# Patient Record
Sex: FEMALE | Race: BLACK OR AFRICAN AMERICAN | NOT HISPANIC OR LATINO
[De-identification: names, ages, dates, MRNs, and addresses within clinical notes are randomized per-mention and may not be internally consistent; named-entity substitution may affect disease eponyms.]

---

## 2022-06-27 ENCOUNTER — APPOINTMENT (OUTPATIENT)
Dept: BREAST CENTER | Facility: CLINIC | Age: 52
End: 2022-06-27
Payer: COMMERCIAL

## 2022-06-27 VITALS — HEIGHT: 65 IN | WEIGHT: 220 LBS | BODY MASS INDEX: 36.65 KG/M2

## 2022-06-27 DIAGNOSIS — Z80.0 FAMILY HISTORY OF MALIGNANT NEOPLASM OF DIGESTIVE ORGANS: ICD-10-CM

## 2022-06-27 DIAGNOSIS — R92.8 OTHER ABNORMAL AND INCONCLUSIVE FINDINGS ON DIAGNOSTIC IMAGING OF BREAST: ICD-10-CM

## 2022-06-27 DIAGNOSIS — Z80.1 FAMILY HISTORY OF MALIGNANT NEOPLASM OF TRACHEA, BRONCHUS AND LUNG: ICD-10-CM

## 2022-06-27 DIAGNOSIS — Z85.72 PERSONAL HISTORY OF NON-HODGKIN LYMPHOMAS: ICD-10-CM

## 2022-06-27 PROBLEM — Z00.00 ENCOUNTER FOR PREVENTIVE HEALTH EXAMINATION: Status: ACTIVE | Noted: 2022-06-27

## 2022-06-27 PROCEDURE — 99203 OFFICE O/P NEW LOW 30 MIN: CPT

## 2022-06-27 RX ORDER — HYDROCHLOROTHIAZIDE 25 MG/1
25 TABLET ORAL
Refills: 0 | Status: ACTIVE | COMMUNITY

## 2022-06-27 RX ORDER — FAMOTIDINE 20 MG/1
20 TABLET, FILM COATED ORAL
Refills: 0 | Status: ACTIVE | COMMUNITY

## 2022-06-27 RX ORDER — ESOMEPRAZOLE MAGNESIUM 40 MG/1
40 CAPSULE, DELAYED RELEASE ORAL
Refills: 0 | Status: ACTIVE | COMMUNITY

## 2022-06-27 NOTE — REVIEW OF SYSTEMS
[Cough] : cough [Negative] : Heme/Lymph [FreeTextEntry3] : change in eyesight [FreeTextEntry4] : change in eyesight

## 2022-06-27 NOTE — HISTORY OF PRESENT ILLNESS
[FreeTextEntry1] : The patient is a nulliparous female of [facility] descent.  She underwent menarche at age 13.  She has no family history of breast or ovarian cancer.  Her father had esophageal cancer and brother had lung cancer and both were smokers.  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  She has been getting routine screening mammography and ultrasound and recent bilateral mammography and ultrasound on June 24, 2022 showed a new 5 mm density in the right breast 10:00 region for which ultrasound-guided core biopsy is being recommended.  She comes in now for a surgical evaluation.

## 2022-06-27 NOTE — ASSESSMENT
[FreeTextEntry1] : The patient is a nulliparous female of [facility] descent.  She underwent menarche at age 13.  She has no family history of breast or ovarian cancer.  Her father had esophageal cancer and brother had lung cancer and both were smokers.  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  She has been getting routine screening mammography and ultrasound and recent bilateral mammography and ultrasound on June 24, 2022 showed a new 5 mm density in the right breast 10:00 region for which ultrasound-guided core biopsy is being recommended.  I reviewed her mammography and ultrasound from [facility] and the mammography was negative but ultrasound does show this 5 mm density in the right breast 10:00 aspect.  I agree with recommendation for an ultrasound-guided core biopsy of this density.  On exam, I cannot feel any suspicious densities however.  I will arrange the ultrasound-guided core biopsy and follow-up on the results and go from there.  The patient understands the need for the biopsy and agrees to proceed as planned.

## 2022-06-27 NOTE — PHYSICAL EXAM
[Normocephalic] : normocephalic [Atraumatic] : atraumatic [EOMI] : extra ocular movement intact [Supple] : supple [No Supraclavicular Adenopathy] : no supraclavicular adenopathy [No Cervical Adenopathy] : no cervical adenopathy [Examined in the supine and seated position] : examined in the supine and seated position [No dominant masses] : no dominant masses in right breast  [No dominant masses] : no dominant masses left breast [No Nipple Retraction] : no left nipple retraction [No Nipple Discharge] : no left nipple discharge [Breast Mass Right Breast ___cm] : no masses [Breast Mass Left Breast ___cm] : no masses [No Axillary Lymphadenopathy] : no left axillary lymphadenopathy [No Edema] : no edema [No Rashes] : no rashes [No Ulceration] : no ulceration [Breast Nipple Inversion] : nipples not inverted [Breast Nipple Retraction] : nipples not retracted [Breast Nipple Flattening] : nipples not flattened [Breast Nipple Fissures] : nipples not fissured [Breast Abnormal Lactation (Galactorrhea)] : no galactorrhea [Breast Abnormal Secretion Bloody Fluid] : no bloody discharge [Breast Abnormal Secretion Serous Fluid] : no serous discharge [Breast Abnormal Secretion Opalescent Fluid] : no milky discharge [de-identified] : On exam, the patient is ptotic D-cup breasts.  On palpation, she has typical fibroglandular nodularity in the upper outer aspects of both breast but no suspicious masses.  She has no axillary, supraclavicular, or cervical adenopathy.

## 2022-06-27 NOTE — REASON FOR VISIT
[Initial Evaluation] : an initial evaluation [FreeTextEntry1] : The patient comes in is of [place] descent with recent abnormal breast imaging for which a right breast biopsy is being recommended.

## 2022-06-27 NOTE — PAST MEDICAL HISTORY
[Menstruating] : The patient is menstruating [Menarche Age ____] : age at menarche was [unfilled] [Total Preg ___] : G[unfilled] [Live Births ___] : P[unfilled]

## 2023-01-03 ENCOUNTER — RESULT REVIEW (OUTPATIENT)
Age: 53
End: 2023-01-03

## 2023-03-24 ENCOUNTER — APPOINTMENT (OUTPATIENT)
Dept: GASTROENTEROLOGY | Facility: CLINIC | Age: 53
End: 2023-03-24
Payer: COMMERCIAL

## 2023-03-24 VITALS
OXYGEN SATURATION: 98 % | HEART RATE: 66 BPM | BODY MASS INDEX: 36.65 KG/M2 | WEIGHT: 220 LBS | SYSTOLIC BLOOD PRESSURE: 154 MMHG | DIASTOLIC BLOOD PRESSURE: 92 MMHG | HEIGHT: 65 IN

## 2023-03-24 DIAGNOSIS — Z78.9 OTHER SPECIFIED HEALTH STATUS: ICD-10-CM

## 2023-03-24 PROCEDURE — 99204 OFFICE O/P NEW MOD 45 MIN: CPT

## 2023-03-24 NOTE — ASSESSMENT
[FreeTextEntry1] : 1. GERD:  Dietary and lifestyle modification. Referred to weight loss management program\par \par 2. History of  colon polyp:  Colonoscopy 7/2024 ( will likely repeat EGD with colonoscopy) \par \par Pertinent available records reviewed\par Risks of the procedures including but not limited to bleeding / perforation / infection / anesthesia complication / missed  lesions explained to the  patient . The patient expressed understanding and a desire to proceed with the procedures.\par \par Risk of not doing procedures includes but is not limited to missed or delayed diagnosis of gastric pathology, colon cancer or other gastrointestinal pathology\par \par A consultation note was provided to the referring provider\par \par \par

## 2023-03-24 NOTE — CONSULT LETTER
[Dear  ___] : Dear  [unfilled], [Consult Letter:] : I had the pleasure of evaluating your patient, [unfilled]. [Please see my note below.] : Please see my note below. [Consult Closing:] : Thank you very much for allowing me to participate in the care of this patient.  If you have any questions, please do not hesitate to contact me. [Sincerely,] : Sincerely, [FreeTextEntry3] : Jenaro Randolph MD\par tel: [phone]\par fax: [phone]\par

## 2023-03-24 NOTE — HISTORY OF PRESENT ILLNESS
[FreeTextEntry1] : OMI SANTAMARIA  is being evaluated at the request of Dr. Wilkes for an opinion re: GERD / history of colon polyp.  Patient is transferring her GI care. Complains of persistent  heartburn, controlled with Nexium. Returns off Nexium. Has tried  Pepcid without  relief. Admits to weight gain\par \par Denies nausea, vomiting, fever, chills,m diarrhea, constipation, melena, hematemesis, BRBPR, dysphagia\par \par \par Records faxed to office and  reviewed :  Colonoscopy ( 7/2019) : diverticulosis / 2  polyps ( ascending / rectum) / normal terminal ileum.  EGD  ( 10/2019)  irregular  Z- line / gastritis/ / gastric  body polyp. Both procedures were performed by Dr. Almendarez at [facility]

## 2023-06-25 ENCOUNTER — RESULT REVIEW (OUTPATIENT)
Age: 53
End: 2023-06-25

## 2023-07-17 ENCOUNTER — RESULT REVIEW (OUTPATIENT)
Age: 53
End: 2023-07-17

## 2023-07-20 ENCOUNTER — NON-APPOINTMENT (OUTPATIENT)
Age: 53
End: 2023-07-20

## 2023-07-21 RX ORDER — SILVER SULFADIAZINE 10 MG/G
1 CREAM TOPICAL DAILY
Qty: 1 | Refills: 0 | Status: ACTIVE | COMMUNITY
Start: 2023-07-21 | End: 1900-01-01

## 2023-07-23 DIAGNOSIS — Z12.31 ENCOUNTER FOR SCREENING MAMMOGRAM FOR MALIGNANT NEOPLASM OF BREAST: ICD-10-CM

## 2023-08-08 ENCOUNTER — APPOINTMENT (OUTPATIENT)
Dept: PLASTIC SURGERY | Facility: CLINIC | Age: 53
End: 2023-08-08
Payer: COMMERCIAL

## 2023-08-08 ENCOUNTER — APPOINTMENT (OUTPATIENT)
Dept: BREAST CENTER | Facility: CLINIC | Age: 53
End: 2023-08-08
Payer: COMMERCIAL

## 2023-08-08 DIAGNOSIS — R92.2 INCONCLUSIVE MAMMOGRAM: ICD-10-CM

## 2023-08-08 DIAGNOSIS — D24.1 BENIGN NEOPLASM OF RIGHT BREAST: ICD-10-CM

## 2023-08-08 DIAGNOSIS — T30.0 BURN OF UNSPECIFIED BODY REGION, UNSPECIFIED DEGREE: ICD-10-CM

## 2023-08-08 DIAGNOSIS — N60.12 DIFFUSE CYSTIC MASTOPATHY OF LEFT BREAST: ICD-10-CM

## 2023-08-08 DIAGNOSIS — L98.8 OTHER SPECIFIED DISORDERS OF THE SKIN AND SUBCUTANEOUS TISSUE: ICD-10-CM

## 2023-08-08 DIAGNOSIS — N60.11 DIFFUSE CYSTIC MASTOPATHY OF LEFT BREAST: ICD-10-CM

## 2023-08-08 PROCEDURE — 99203 OFFICE O/P NEW LOW 30 MIN: CPT

## 2023-08-08 PROCEDURE — 99213 OFFICE O/P EST LOW 20 MIN: CPT

## 2023-08-08 NOTE — HISTORY OF PRESENT ILLNESS
[FreeTextEntry1] : The patient is a nulliparous female of [facility] descent.  She underwent menarche at age 13.  She has no family history of breast or ovarian cancer.  Her father had esophageal cancer and brother had lung cancer and both were smokers.  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  She has been getting routine screening mammography and ultrasound and bilateral mammography and ultrasound on June 24, 2022 showed a new 5 mm density in the right breast 10:00 region for which ultrasound-guided core biopsy was recommended and performed on June 29, 2022 which showed a fibroadenoma.  Her mammography and ultrasound performed on June 26, 2023 at [facility] showed a stable right breast 10:00 density but there was a new asymmetry seen in the inner aspect of the left breast which persisted on spot compression views and stereotactic biopsy was performed on July 18, 2023 with the final pathology just showing stromal fibrosis and predominantly fatty breast tissue which was felt to be concordant.  She did develop a significant area of full-thickness skin loss on the inferior aspect however of the left breast related to the stereotactic core biopsy.  I initially evaluated this soon after the stereotactic biopsy and she started Silvadene Telfa dressing changes.  She comes in now for reevaluation of this wound.

## 2023-08-08 NOTE — ASSESSMENT
[FreeTextEntry1] : The patient is a nulliparous female of [facility] descent.  She underwent menarche at age 13.  She has no family history of breast or ovarian cancer.  Her father had esophageal cancer and brother had lung cancer and both were smokers.  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  She has been getting routine screening mammography and ultrasound and recent bilateral mammography and ultrasound on June 24, 2022 showed a new 5 mm density in the right breast 10:00 region for which ultrasound-guided core biopsy was performed on June 29, 2022 showing a benign fibroadenoma.  Her most recent mammography and ultrasound performed on June 26, 2023 at [facility] showed a stable right breast 10:00 density but there was a new asymmetry seen in the inner aspect of the left breast which persisted on spot compression views and stereotactic biopsy was performed on July 18, 2023 with the final pathology just showing stromal fibrosis and predominantly fatty breast tissue which was felt to be concordant.  She did develop a significant area of full-thickness skin loss on the inferior aspect however of the left breast related to the stereotactic core biopsy.  I initially evaluated this soon after the stereotactic biopsy and she started Silvadene Telfa dressing changes.  This region is actually on the polar opposite side of the breast from the needle entrance site from the core biopsy.  I had spoken to the radiologist, Dr. Heaven Gabriel, regarding this burn and she is unsure how this developed.  I saw this first soon after the biopsy a couple weeks ago and started Silvadene.  On exam, the wound is clean with a central eschar and some peripheral early granulation.  I am advising continued Silvadene dressings but given the patient's concern I am having her see Dr. Moreno in consultation today to look at this wound to see if there is any advice he has to offer.  The patient will go down there today for consultation.

## 2023-08-08 NOTE — PHYSICAL EXAM
[Normocephalic] : normocephalic [Atraumatic] : atraumatic [EOMI] : extra ocular movement intact [Supple] : supple [No Supraclavicular Adenopathy] : no supraclavicular adenopathy [No Cervical Adenopathy] : no cervical adenopathy [Examined in the supine and seated position] : examined in the supine and seated position [No dominant masses] : no dominant masses in right breast  [No dominant masses] : no dominant masses left breast [No Nipple Retraction] : no left nipple retraction [No Nipple Discharge] : no left nipple discharge [Breast Mass Right Breast ___cm] : no masses [Breast Mass Left Breast ___cm] : no masses [No Axillary Lymphadenopathy] : no left axillary lymphadenopathy [No Edema] : no edema [Breast Nipple Inversion] : nipples not inverted [Breast Nipple Retraction] : nipples not retracted [Breast Nipple Flattening] : nipples not flattened [Breast Nipple Fissures] : nipples not fissured [Breast Abnormal Lactation (Galactorrhea)] : no galactorrhea [Breast Abnormal Secretion Bloody Fluid] : no bloody discharge [Breast Abnormal Secretion Serous Fluid] : no serous discharge [Breast Abnormal Secretion Opalescent Fluid] : no milky discharge [de-identified] : On exam, the patient is ptotic D-cup breasts.  On palpation, she has typical fibroglandular nodularity in the upper outer aspects of both breast but no suspicious masses.  She has this obvious superficial burn on the medial aspect of the left breast little bigger than a half dollar size with a central eschar and some peripheral granulation.  She has been using Silvadene and the wound looks clean.  She has no axillary, supraclavicular, or cervical adenopathy. [de-identified] : Medial inferior superficial burn with central eschar and surrounding healing with granulation.  Wound is clean. [de-identified] : Right lower inner quadrant superficial burn with central eschar and surrounding slow granulation

## 2023-08-08 NOTE — HISTORY OF PRESENT ILLNESS
[FreeTextEntry1] : 53 y/o female presents for initial consultation referred by Dr Perez for possibly left breast eschar. This occurred after a left breast stereotactic core biopsy. Patient reports no family history of breast or ovarian cancer. No history of bleeding/clotting disorders.  PE: Left breast full thickness skin necrosis   A/P: Recommended debridement. debridement today, but patient is opposed to surgical procedure. Will plan to continue dressing changes for now. Follow up in 2 weeks to reassess healing.

## 2023-08-08 NOTE — REASON FOR VISIT
[Follow-Up: _____] : a [unfilled] follow-up visit [FreeTextEntry1] : The patient comes in is of [facility] descent with a history of a right breast 10:00 ultrasound-guided core biopsy performed in June 2022 showing a fibroadenoma.  She then developed some asymmetry in the inner aspect of the left breast seen on mammography in June 2023 and underwent a left breast stereotactic core biopsy in July 2023 showing predominantly fatty tissue and stromal fibrosis which was felt to be concordant.  Unfortunately, she developed a burn on the lower inner aspect of the skin of the left breast and has been doing Silvadene dressings.  She comes in now for a wound evaluation.

## 2023-08-22 ENCOUNTER — APPOINTMENT (OUTPATIENT)
Dept: PLASTIC SURGERY | Facility: CLINIC | Age: 53
End: 2023-08-22

## 2024-02-02 ENCOUNTER — NON-APPOINTMENT (OUTPATIENT)
Age: 54
End: 2024-02-02

## 2024-05-10 ENCOUNTER — TRANSCRIPTION ENCOUNTER (OUTPATIENT)
Age: 54
End: 2024-05-10

## 2024-06-13 ENCOUNTER — APPOINTMENT (OUTPATIENT)
Dept: GASTROENTEROLOGY | Facility: CLINIC | Age: 54
End: 2024-06-13
Payer: COMMERCIAL

## 2024-06-13 VITALS
DIASTOLIC BLOOD PRESSURE: 82 MMHG | WEIGHT: 220 LBS | HEART RATE: 56 BPM | BODY MASS INDEX: 36.65 KG/M2 | SYSTOLIC BLOOD PRESSURE: 128 MMHG | OXYGEN SATURATION: 96 % | HEIGHT: 65 IN

## 2024-06-13 DIAGNOSIS — K21.9 GASTRO-ESOPHAGEAL REFLUX DISEASE W/OUT ESOPHAGITIS: ICD-10-CM

## 2024-06-13 DIAGNOSIS — K13.79 OTHER LESIONS OF ORAL MUCOSA: ICD-10-CM

## 2024-06-13 DIAGNOSIS — Z86.010 PERSONAL HISTORY OF COLONIC POLYPS: ICD-10-CM

## 2024-06-13 PROCEDURE — 99215 OFFICE O/P EST HI 40 MIN: CPT

## 2024-06-13 NOTE — PHYSICAL EXAM
[Alert] : alert [Sclera] : the sclera and conjunctiva were normal [Normal Appearance] : the appearance of the neck was normal [No Respiratory Distress] : no respiratory distress [Abdomen Soft] : soft [Abnormal Walk] : normal gait [Normal Color / Pigmentation] : normal skin color and pigmentation [Cranial Nerves Intact] : cranial nerves 2-12 were intact [Oriented To Time, Place, And Person] : oriented to person, place, and time [de-identified] : dark spot posterior right hard palate [de-identified] : Deferred pending colonoscopy

## 2024-06-13 NOTE — HISTORY OF PRESENT ILLNESS
[FreeTextEntry1] : Presents for follow up. Complains of increasing GERD ( especially with RUM) . Describes 2 episodes of self limited nausea in the past week.  Finally, has noticed a dark  / black spot on her right posterior hard palate over the past 2 months  Denies melena, hematemesis, fever, chills, diarrhea, constipation  Last evaluated in 3/2023  re: GERD / history of colon polyp. Patient was  transferring her GI care. Complained of persistent heartburn, controlled with Nexium. Returns off Nexium. Has tried Pepcid without relief. Admits to weight gain Lifes[employer] and dietary modification recommended. Referred for weight loss management. Plan was colonoscopy  with possible repeat EGD in  2024  Records faxed to office and reviewed : Colonoscopy ( 7/2019) : diverticulosis / 2 polyps ( ascending / rectum) / normal terminal ileum. EGD ( 10/2019) irregular Z- line / gastritis/ / gastric body polyp. Both procedures were performed by Dr. Almendarez at [facility]

## 2024-06-13 NOTE — ASSESSMENT
[FreeTextEntry1] : 1. GERD:  EGD planned along with dietary and lifestyle modification  2. History of colon polyp:  Colonoscopy planned  3. Spot on hard palate: recommended NET  and/or dental evaluation  Pertinent available records reviewed Risks of the procedures including but not limited to bleeding / perforation / infection / anesthesia complication / missed  lesions explained to the  patient . The patient expressed understanding and a desire to proceed with the procedures.  Risk of not doing procedures includes but is not limited to missed or delayed diagnosis of gastric pathology, colon cancer or other gastrointestinal pathology

## 2024-06-30 ENCOUNTER — RESULT REVIEW (OUTPATIENT)
Age: 54
End: 2024-06-30

## 2024-07-10 ENCOUNTER — RESULT REVIEW (OUTPATIENT)
Age: 54
End: 2024-07-10

## 2024-07-11 ENCOUNTER — APPOINTMENT (OUTPATIENT)
Dept: GASTROENTEROLOGY | Facility: HOSPITAL | Age: 54
End: 2024-07-11

## 2024-07-11 ENCOUNTER — RESULT REVIEW (OUTPATIENT)
Age: 54
End: 2024-07-11

## 2024-10-04 ENCOUNTER — APPOINTMENT (OUTPATIENT)
Dept: BREAST CENTER | Facility: CLINIC | Age: 54
End: 2024-10-04
Payer: COMMERCIAL

## 2024-10-04 VITALS
BODY MASS INDEX: 34.99 KG/M2 | WEIGHT: 210 LBS | HEIGHT: 65 IN | DIASTOLIC BLOOD PRESSURE: 90 MMHG | OXYGEN SATURATION: 98 % | SYSTOLIC BLOOD PRESSURE: 150 MMHG | HEART RATE: 82 BPM

## 2024-10-04 DIAGNOSIS — Z12.31 ENCOUNTER FOR SCREENING MAMMOGRAM FOR MALIGNANT NEOPLASM OF BREAST: ICD-10-CM

## 2024-10-04 DIAGNOSIS — R92.30 DENSE BREASTS, UNSPECIFIED: ICD-10-CM

## 2024-10-04 DIAGNOSIS — Z85.72 PERSONAL HISTORY OF NON-HODGKIN LYMPHOMAS: ICD-10-CM

## 2024-10-04 DIAGNOSIS — N60.12 DIFFUSE CYSTIC MASTOPATHY OF LEFT BREAST: ICD-10-CM

## 2024-10-04 DIAGNOSIS — D24.1 BENIGN NEOPLASM OF RIGHT BREAST: ICD-10-CM

## 2024-10-04 DIAGNOSIS — N60.11 DIFFUSE CYSTIC MASTOPATHY OF LEFT BREAST: ICD-10-CM

## 2024-10-04 PROCEDURE — 99212 OFFICE O/P EST SF 10 MIN: CPT

## 2024-10-04 NOTE — PHYSICAL EXAM
[Normocephalic] : normocephalic [Atraumatic] : atraumatic [EOMI] : extra ocular movement intact [Supple] : supple [No Supraclavicular Adenopathy] : no supraclavicular adenopathy [No Cervical Adenopathy] : no cervical adenopathy [Examined in the supine and seated position] : examined in the supine and seated position [No dominant masses] : no dominant masses in right breast  [No dominant masses] : no dominant masses left breast [No Nipple Retraction] : no left nipple retraction [No Nipple Discharge] : no left nipple discharge [Breast Mass Right Breast ___cm] : no masses [Breast Mass Left Breast ___cm] : no masses [No Axillary Lymphadenopathy] : no left axillary lymphadenopathy [No Edema] : no edema [No Rashes] : no rashes [Breast Nipple Inversion] : nipples not inverted [Breast Nipple Retraction] : nipples not retracted [Breast Nipple Flattening] : nipples not flattened [Breast Nipple Fissures] : nipples not fissured [Breast Abnormal Lactation (Galactorrhea)] : no galactorrhea [Breast Abnormal Secretion Bloody Fluid] : no bloody discharge [Breast Abnormal Secretion Serous Fluid] : no serous discharge [Breast Abnormal Secretion Opalescent Fluid] : no milky discharge [de-identified] : On exam, the patient is ptotic D-cup breasts.  On palpation, she has typical fibroglandular nodularity in the upper outer aspects of both breast but no suspicious masses.  The superficial burn on the medial aspect of the left breast eventually healed with local wound care and Silvadene. She has no axillary, supraclavicular, or cervical adenopathy. [de-identified] : Medial inferior superficial burn has healed with a flat scar

## 2024-10-04 NOTE — ASSESSMENT
[FreeTextEntry1] : The patient is a 53 year old nulliparous female of [facility] descent.  She underwent menarche at age 13.  She has no family history of breast or ovarian cancer.  Her father had esophageal cancer and brother had lung cancer and both were smokers.  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  She has been getting routine screening mammography and ultrasound and recent bilateral mammography and ultrasound on June 24, 2022 showed a new 5 mm density in the right breast 10:00 region for which ultrasound-guided core biopsy was performed on June 29, 2022 showing a benign fibroadenoma.  Her most recent mammography and ultrasound performed on June 26, 2023 at [facility] showed a stable right breast 10:00 density but there was a new asymmetry seen in the inner aspect of the left breast which persisted on spot compression views and stereotactic biopsy was performed on July 18, 2023 with the final pathology just showing stromal fibrosis and predominantly fatty breast tissue which was felt to be concordant.  She did develop a significant area of full-thickness skin loss on the inferior aspect however of the left breast related to the stereotactic core biopsy.  I initially evaluated this soon after the stereotactic biopsy and she started Silvadene Telfa dressing changes.  She was seen by Dr. Moreno for a plastic surgery evaluation and the wound eventually healed and secondarily.  On exam, the wound on the medial aspect of the left breast has healed with flat scarring.  And she has no suspicious masses she underwent her last bilateral mammography and ultrasound which was reviewed from July 1, 2024 and performed on [facility] which showed no suspicious findings with stable benign asymmetry in the inner aspect of the left breast.  The patient was reassured and she should continue yearly clinical exams and her next bilateral mammography and ultrasound will be due in July 2025 and she was given prescriptions.

## 2024-10-04 NOTE — HISTORY OF PRESENT ILLNESS
[FreeTextEntry1] : The patient is a 53 year old nulliparous female of [facility] descent.  She underwent menarche at age 13.  She has no family history of breast or ovarian cancer.  Her father had esophageal cancer and brother had lung cancer and both were smokers.  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  She has been getting routine screening mammography and ultrasound and bilateral mammography and ultrasound on June 24, 2022 showed a new 5 mm density in the right breast 10:00 region for which ultrasound-guided core biopsy was recommended and performed on June 29, 2022 which showed a fibroadenoma.  Her mammography and ultrasound performed on June 26, 2023 at [facility] showed a stable right breast 10:00 density but there was a new asymmetry seen in the inner aspect of the left breast which persisted on spot compression views and stereotactic biopsy was performed on July 18, 2023 with the final pathology just showing stromal fibrosis and predominantly fatty breast tissue which was felt to be concordant.  She did develop a significant area of full-thickness skin loss on the inferior aspect however of the left breast related to the stereotactic core biopsy.  I initially evaluated this soon after the stereotactic biopsy and she started Silvadene Telfa dressing changes.  She was seen by Dr. Moreno and the wound eventually healed then.  She comes in now for routine follow-up.

## 2024-10-04 NOTE — ASSESSMENT
[FreeTextEntry1] : The patient is a 53 year old nulliparous female of [facility] descent.  She underwent menarche at age 13.  She has no family history of breast or ovarian cancer.  Her father had esophageal cancer and brother had lung cancer and both were smokers.  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  She has been getting routine screening mammography and ultrasound and recent bilateral mammography and ultrasound on June 24, 2022 showed a new 5 mm density in the right breast 10:00 region for which ultrasound-guided core biopsy was performed on June 29, 2022 showing a benign fibroadenoma.  Her most recent mammography and ultrasound performed on June 26, 2023 at [facility] showed a stable right breast 10:00 density but there was a new asymmetry seen in the inner aspect of the left breast which persisted on spot compression views and stereotactic biopsy was performed on July 18, 2023 with the final pathology just showing stromal fibrosis and predominantly fatty breast tissue which was felt to be concordant.  She did develop a significant area of full-thickness skin loss on the inferior aspect however of the left breast related to the stereotactic core biopsy.  I initially evaluated this soon after the stereotactic biopsy and she started Silvadene Telfa dressing changes.  She was seen by Dr. Moreno for a plastic surgery evaluation and the wound eventually healed and secondarily.  On exam, the wound on the medial aspect of the left breast has healed with flat scarring.  And she has no suspicious masses she underwent her last bilateral mammography and ultrasound which was reviewed from July 1, 2024 and performed on [facility] which showed no suspicious findings with stable benign asymmetry in the inner aspect of the left breast.  The patient was reassured and she should continue yearly clinical exams and her next bilateral mammography and ultrasound will be due in July 2025 and she was given prescriptions.

## 2024-10-04 NOTE — REASON FOR VISIT
[Follow-Up: _____] : a [unfilled] follow-up visit [FreeTextEntry1] : The patient comes in is of [facility] descent with a history of a right breast 10:00 ultrasound-guided core biopsy performed in June 2022 showing a fibroadenoma.  She then developed some asymmetry in the inner aspect of the left breast seen on mammography in June 2023 and underwent a left breast stereotactic core biopsy in July 2023 showing predominantly fatty tissue and stromal fibrosis which was felt to be concordant.  Unfortunately, she developed a burn on the lower inner aspect of the skin of the left breast and performed Silvadene dressings and the wound eventually healed in.  She comes in now for routine breast cancer screening and clinical follow-up.

## 2024-10-04 NOTE — REASON FOR VISIT
[Follow-Up: _____] : a [unfilled] follow-up visit [FreeTextEntry1] : The patient comes in is of 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  She then developed some asymmetry in the inner aspect of the left breast seen on mammography in June 2023 and underwent a left breast stereotactic core biopsy in July 2023 showing predominantly fatty tissue and stromal fibrosis which was felt to be concordant.  Unfortunately, she developed a burn on the lower inner aspect of the skin of the left breast and performed Silvadene dressings and the wound eventually healed in.  She comes in now for routine breast cancer screening and clinical follow-up.

## 2024-10-04 NOTE — PHYSICAL EXAM
[Normocephalic] : normocephalic [Atraumatic] : atraumatic [EOMI] : extra ocular movement intact [Supple] : supple [No Supraclavicular Adenopathy] : no supraclavicular adenopathy [No Cervical Adenopathy] : no cervical adenopathy [Examined in the supine and seated position] : examined in the supine and seated position [No dominant masses] : no dominant masses in right breast  [No dominant masses] : no dominant masses left breast [No Nipple Retraction] : no left nipple retraction [No Nipple Discharge] : no left nipple discharge [Breast Mass Right Breast ___cm] : no masses [Breast Mass Left Breast ___cm] : no masses [No Axillary Lymphadenopathy] : no left axillary lymphadenopathy [No Edema] : no edema [No Rashes] : no rashes [Breast Nipple Inversion] : nipples not inverted [Breast Nipple Retraction] : nipples not retracted [Breast Nipple Flattening] : nipples not flattened [Breast Nipple Fissures] : nipples not fissured [Breast Abnormal Lactation (Galactorrhea)] : no galactorrhea [Breast Abnormal Secretion Bloody Fluid] : no bloody discharge [Breast Abnormal Secretion Serous Fluid] : no serous discharge [Breast Abnormal Secretion Opalescent Fluid] : no milky discharge [de-identified] : On exam, the patient is ptotic D-cup breasts.  On palpation, she has typical fibroglandular nodularity in the upper outer aspects of both breast but no suspicious masses.  The superficial burn on the medial aspect of the left breast eventually healed with local wound care and Silvadene. She has no axillary, supraclavicular, or cervical adenopathy. [de-identified] : Medial inferior superficial burn has healed with a flat scar

## 2024-10-04 NOTE — HISTORY OF PRESENT ILLNESS
[FreeTextEntry1] : The patient is a 53 year old nulliparous female of [facility] descent.  She underwent menarche at age 13.  She has no family history of breast or ovarian cancer.  Her father had esophageal cancer and brother had lung cancer and both were smokers.  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  She has been getting routine screening mammography and ultrasound and bilateral mammography and ultrasound on June 24, 2022 showed a new 5 mm density in the right breast 10:00 region for which ultrasound-guided core biopsy was recommended and performed on June 29, 2022 which showed a fibroadenoma.  Her mammography and ultrasound performed on June 26, 2023 at [facility] showed a stable right breast 10:00 density but there was a new asymmetry seen in the inner aspect of the left breast which persisted on spot compression views and stereotactic biopsy was performed on July 18, 2023 with the final pathology just showing stromal fibrosis and predominantly fatty breast tissue which was felt to be concordant.  She did develop a significant area of full-thickness skin loss on the inferior aspect however of the left breast related to the stereotactic core biopsy.  I initially evaluated this soon after the stereotactic biopsy and she started Silvadene Telfa dressing changes.  She was seen by Dr. Moreno and the wound eventually healed then.  She comes in now for routine follow-up.

## 2025-03-10 PROBLEM — R06.2 WHEEZE: Status: ACTIVE | Noted: 2025-03-10

## 2025-03-10 PROBLEM — R05.9 COUGH: Status: ACTIVE | Noted: 2025-03-10

## 2025-03-13 ENCOUNTER — RESULT REVIEW (OUTPATIENT)
Age: 55
End: 2025-03-13

## 2025-03-13 ENCOUNTER — APPOINTMENT (OUTPATIENT)
Dept: PULMONOLOGY | Facility: CLINIC | Age: 55
End: 2025-03-13
Payer: COMMERCIAL

## 2025-03-13 VITALS
BODY MASS INDEX: 36.15 KG/M2 | WEIGHT: 217 LBS | HEIGHT: 65 IN | SYSTOLIC BLOOD PRESSURE: 128 MMHG | DIASTOLIC BLOOD PRESSURE: 82 MMHG | HEART RATE: 73 BPM | RESPIRATION RATE: 16 BRPM | TEMPERATURE: 97.2 F | OXYGEN SATURATION: 100 %

## 2025-03-13 DIAGNOSIS — R05.9 COUGH, UNSPECIFIED: ICD-10-CM

## 2025-03-13 DIAGNOSIS — R06.2 WHEEZING: ICD-10-CM

## 2025-03-13 PROCEDURE — 99204 OFFICE O/P NEW MOD 45 MIN: CPT

## 2025-03-13 RX ORDER — BUDESONIDE AND FORMOTEROL FUMARATE DIHYDRATE 160; 4.5 UG/1; UG/1
160-4.5 AEROSOL RESPIRATORY (INHALATION) TWICE DAILY
Qty: 1 | Refills: 11 | Status: ACTIVE | COMMUNITY
Start: 2025-03-13 | End: 1900-01-01

## 2025-04-02 ENCOUNTER — APPOINTMENT (OUTPATIENT)
Dept: GASTROENTEROLOGY | Facility: CLINIC | Age: 55
End: 2025-04-02
Payer: COMMERCIAL

## 2025-04-02 VITALS
WEIGHT: 217 LBS | HEIGHT: 65 IN | SYSTOLIC BLOOD PRESSURE: 120 MMHG | OXYGEN SATURATION: 99 % | DIASTOLIC BLOOD PRESSURE: 80 MMHG | HEART RATE: 71 BPM | BODY MASS INDEX: 36.15 KG/M2

## 2025-04-02 DIAGNOSIS — R05.9 COUGH, UNSPECIFIED: ICD-10-CM

## 2025-04-02 DIAGNOSIS — K21.9 GASTRO-ESOPHAGEAL REFLUX DISEASE W/OUT ESOPHAGITIS: ICD-10-CM

## 2025-04-02 PROCEDURE — 99214 OFFICE O/P EST MOD 30 MIN: CPT

## 2025-04-02 PROCEDURE — G2211 COMPLEX E/M VISIT ADD ON: CPT

## 2025-04-02 RX ORDER — PANTOPRAZOLE 40 MG/1
40 TABLET, DELAYED RELEASE ORAL
Qty: 90 | Refills: 3 | Status: ACTIVE | COMMUNITY
Start: 2025-04-02 | End: 1900-01-01

## 2025-04-15 ENCOUNTER — APPOINTMENT (OUTPATIENT)
Dept: PULMONOLOGY | Facility: CLINIC | Age: 55
End: 2025-04-15
Payer: COMMERCIAL

## 2025-04-15 VITALS
TEMPERATURE: 97.3 F | HEIGHT: 65 IN | OXYGEN SATURATION: 100 % | BODY MASS INDEX: 35.99 KG/M2 | WEIGHT: 216 LBS | SYSTOLIC BLOOD PRESSURE: 119 MMHG | RESPIRATION RATE: 20 BRPM | DIASTOLIC BLOOD PRESSURE: 75 MMHG | HEART RATE: 61 BPM

## 2025-04-15 DIAGNOSIS — R06.2 WHEEZING: ICD-10-CM

## 2025-04-15 PROCEDURE — 99213 OFFICE O/P EST LOW 20 MIN: CPT

## 2025-04-15 RX ORDER — BUDESONIDE AND FORMOTEROL FUMARATE DIHYDRATE 80; 4.5 UG/1; UG/1
80-4.5 AEROSOL RESPIRATORY (INHALATION) TWICE DAILY
Qty: 1 | Refills: 11 | Status: ACTIVE | COMMUNITY
Start: 2025-04-15 | End: 1900-01-01

## 2025-06-06 ENCOUNTER — APPOINTMENT (OUTPATIENT)
Dept: GASTROENTEROLOGY | Facility: CLINIC | Age: 55
End: 2025-06-06

## 2025-07-02 ENCOUNTER — RESULT REVIEW (OUTPATIENT)
Age: 55
End: 2025-07-02

## 2025-08-05 DIAGNOSIS — M25.511 PAIN IN RIGHT SHOULDER: ICD-10-CM

## 2025-08-08 ENCOUNTER — RESULT REVIEW (OUTPATIENT)
Age: 55
End: 2025-08-08

## 2025-08-08 ENCOUNTER — APPOINTMENT (OUTPATIENT)
Facility: CLINIC | Age: 55
End: 2025-08-08
Payer: COMMERCIAL

## 2025-08-08 VITALS
OXYGEN SATURATION: 100 % | RESPIRATION RATE: 16 BRPM | WEIGHT: 216 LBS | BODY MASS INDEX: 35.99 KG/M2 | HEIGHT: 65 IN | SYSTOLIC BLOOD PRESSURE: 120 MMHG | DIASTOLIC BLOOD PRESSURE: 85 MMHG | HEART RATE: 79 BPM

## 2025-08-08 DIAGNOSIS — M75.21 BICIPITAL TENDINITIS, RIGHT SHOULDER: ICD-10-CM

## 2025-08-08 DIAGNOSIS — M25.811 OTHER SPECIFIED JOINT DISORDERS, RIGHT SHOULDER: ICD-10-CM

## 2025-08-08 DIAGNOSIS — S43.421A SPRAIN OF RIGHT ROTATOR CUFF CAPSULE, INITIAL ENCOUNTER: ICD-10-CM

## 2025-08-08 PROCEDURE — 99204 OFFICE O/P NEW MOD 45 MIN: CPT
